# Patient Record
Sex: FEMALE | Race: WHITE | NOT HISPANIC OR LATINO | Employment: FULL TIME | URBAN - METROPOLITAN AREA
[De-identification: names, ages, dates, MRNs, and addresses within clinical notes are randomized per-mention and may not be internally consistent; named-entity substitution may affect disease eponyms.]

---

## 2023-05-18 ENCOUNTER — APPOINTMENT (OUTPATIENT)
Dept: PRIMARY CARE | Facility: CLINIC | Age: 58
End: 2023-05-18
Payer: COMMERCIAL

## 2023-06-05 ENCOUNTER — OFFICE VISIT (OUTPATIENT)
Dept: PRIMARY CARE | Facility: CLINIC | Age: 58
End: 2023-06-05
Payer: COMMERCIAL

## 2023-06-05 VITALS
HEIGHT: 64 IN | HEART RATE: 79 BPM | OXYGEN SATURATION: 96 % | WEIGHT: 228 LBS | BODY MASS INDEX: 38.93 KG/M2 | SYSTOLIC BLOOD PRESSURE: 102 MMHG | DIASTOLIC BLOOD PRESSURE: 70 MMHG | RESPIRATION RATE: 17 BRPM

## 2023-06-05 DIAGNOSIS — F41.9 ANXIETY DISORDER, UNSPECIFIED TYPE: ICD-10-CM

## 2023-06-05 DIAGNOSIS — H60.399: ICD-10-CM

## 2023-06-05 DIAGNOSIS — K21.9 GERD WITHOUT ESOPHAGITIS: ICD-10-CM

## 2023-06-05 DIAGNOSIS — N60.09 BENIGN BREAST CYST IN FEMALE, UNSPECIFIED LATERALITY: ICD-10-CM

## 2023-06-05 DIAGNOSIS — Z00.00 HEALTHCARE MAINTENANCE: Primary | ICD-10-CM

## 2023-06-05 DIAGNOSIS — Z13.21 ENCOUNTER FOR VITAMIN DEFICIENCY SCREENING: ICD-10-CM

## 2023-06-05 DIAGNOSIS — Z13.6 SCREENING FOR CARDIOVASCULAR CONDITION: ICD-10-CM

## 2023-06-05 PROBLEM — E66.9 OBESITY, CLASS II, BMI 35-39.9: Status: ACTIVE | Noted: 2019-12-02

## 2023-06-05 PROBLEM — E04.1 THYROID NODULE: Status: ACTIVE | Noted: 2018-11-07

## 2023-06-05 PROBLEM — E66.812 OBESITY, CLASS II, BMI 35-39.9: Status: ACTIVE | Noted: 2019-12-02

## 2023-06-05 PROBLEM — H90.6 MIXED CONDUCTIVE AND SENSORINEURAL HEARING LOSS OF BOTH EARS: Status: ACTIVE | Noted: 2018-11-16

## 2023-06-05 PROBLEM — L50.1 IDIOPATHIC URTICARIA: Status: ACTIVE | Noted: 2021-10-14

## 2023-06-05 PROBLEM — G47.33 OSA ON CPAP: Status: ACTIVE | Noted: 2023-06-05

## 2023-06-05 PROBLEM — E04.2 MULTINODULAR GOITER: Status: ACTIVE | Noted: 2023-06-05

## 2023-06-05 PROCEDURE — 1036F TOBACCO NON-USER: CPT | Performed by: INTERNAL MEDICINE

## 2023-06-05 PROCEDURE — 99386 PREV VISIT NEW AGE 40-64: CPT | Performed by: INTERNAL MEDICINE

## 2023-06-05 RX ORDER — FAMOTIDINE 20 MG/1
TABLET, FILM COATED ORAL
COMMUNITY
Start: 2015-02-02

## 2023-06-05 RX ORDER — CIPROFLOXACIN AND DEXAMETHASONE 3; 1 MG/ML; MG/ML
4 SUSPENSION/ DROPS AURICULAR (OTIC) 2 TIMES DAILY
Qty: 2.8 ML | Refills: 0 | Status: SHIPPED | OUTPATIENT
Start: 2023-06-05 | End: 2023-06-12

## 2023-06-05 RX ORDER — ESCITALOPRAM OXALATE 20 MG/1
20 TABLET ORAL DAILY
COMMUNITY
End: 2023-06-05 | Stop reason: SDUPTHER

## 2023-06-05 RX ORDER — ESCITALOPRAM OXALATE 20 MG/1
20 TABLET ORAL DAILY
Qty: 90 TABLET | Refills: 3 | Status: SHIPPED | OUTPATIENT
Start: 2023-06-05

## 2023-06-05 ASSESSMENT — ENCOUNTER SYMPTOMS
MUSCULOSKELETAL NEGATIVE: 1
ENDOCRINE NEGATIVE: 1
RESPIRATORY NEGATIVE: 1
PSYCHIATRIC NEGATIVE: 1
HEMATOLOGIC/LYMPHATIC NEGATIVE: 1
DEPRESSION: 0
CARDIOVASCULAR NEGATIVE: 1
NEUROLOGICAL NEGATIVE: 1
EYES NEGATIVE: 1
CONSTITUTIONAL NEGATIVE: 1
GASTROINTESTINAL NEGATIVE: 1

## 2023-06-05 ASSESSMENT — PATIENT HEALTH QUESTIONNAIRE - PHQ9
1. LITTLE INTEREST OR PLEASURE IN DOING THINGS: NOT AT ALL
2. FEELING DOWN, DEPRESSED OR HOPELESS: NOT AT ALL
SUM OF ALL RESPONSES TO PHQ9 QUESTIONS 1 AND 2: 0

## 2023-06-05 NOTE — PROGRESS NOTES
Subjective   Patient ID: Lexy Cummings is a 58 y.o. female who presents for Establish Care (NPV, physical ).  HPI    Npv  here for pe.   Came home from cruise with bad cough neg coivd tests for 3 tests.  Cont to  have cough.  Has lost her voice.  Has laryngitis 2-3 times a year.   Co increased weight.     Mammo  repeat image in April .      Gerd.  She takes low dose famotidine  wheich  she feels works pretty well. Sse tries to watch  chocolate, coffee.  She likes spicey foods.     Breast  cancer in family mom and sister.   Father  passed lung  cancer 18 mo ago.         Review of Systems   Constitutional: Negative.    HENT: Negative.     Eyes: Negative.    Respiratory: Negative.     Cardiovascular: Negative.    Gastrointestinal: Negative.    Endocrine: Negative.    Musculoskeletal: Negative.    Skin: Negative.    Neurological: Negative.    Hematological: Negative.    Psychiatric/Behavioral: Negative.     All other systems reviewed and are negative.      Objective   Physical Exam  Vitals and nursing note reviewed.   Constitutional:       Appearance: Normal appearance.   HENT:      Head: Normocephalic and atraumatic.      Right Ear: Tympanic membrane and ear canal normal. Decreased hearing noted. No drainage, swelling or tenderness.      Left Ear: Decreased hearing noted. Drainage present. No swelling or tenderness.      Ears:      Comments: Cheikh  hearing loss . Cheikh  hearing aide. Diffuse left canal exudate  nontender. Tm not  visualized.      Nose: Nose normal.      Mouth/Throat:      Mouth: Mucous membranes are moist.      Pharynx: Oropharynx is clear.   Eyes:      Extraocular Movements: Extraocular movements intact.      Conjunctiva/sclera: Conjunctivae normal.      Pupils: Pupils are equal, round, and reactive to light.   Cardiovascular:      Rate and Rhythm: Normal rate and regular rhythm.      Pulses: Normal pulses.      Heart sounds: Normal heart sounds.   Pulmonary:      Effort: Pulmonary effort is normal.       Breath sounds: Normal breath sounds.   Musculoskeletal:         General: Normal range of motion.      Cervical back: Neck supple.   Skin:     General: Skin is warm and dry.      Capillary Refill: Capillary refill takes 2 to 3 seconds.   Neurological:      General: No focal deficit present.      Mental Status: She is alert and oriented to person, place, and time. Mental status is at baseline.   Psychiatric:         Mood and Affect: Mood normal.         Behavior: Behavior normal.         Thought Content: Thought content normal.         Judgment: Judgment normal.         Assessment/Plan   Problem List Items Addressed This Visit          Medium    Anxiety disorder    Relevant Medications    escitalopram (Lexapro) 20 mg tablet     Other Visit Diagnoses       Healthcare maintenance    -  Primary    Relevant Orders    CBC    Comprehensive Metabolic Panel    Lipid Panel    TSH with reflex to Free T4 if abnormal    GERD without esophagitis        Screening for cardiovascular condition        Relevant Orders    Lipid Panel    Encounter for vitamin deficiency screening        Relevant Orders    Vitamin D 1,25 Dihydroxy    Benign breast cyst in female, unspecified laterality        Otitis externa, chronic infective        Relevant Medications    ciprofloxacin-dexamethasone (CiproDEX) otic suspension    Other Relevant Orders    Referral to ENT

## 2023-06-13 DIAGNOSIS — N39.0 URINARY TRACT INFECTION WITHOUT HEMATURIA, SITE UNSPECIFIED: ICD-10-CM

## 2023-06-13 DIAGNOSIS — R30.0 DYSURIA: Primary | ICD-10-CM

## 2023-06-15 ENCOUNTER — LAB (OUTPATIENT)
Dept: LAB | Facility: LAB | Age: 58
End: 2023-06-15
Payer: COMMERCIAL

## 2023-06-15 DIAGNOSIS — N39.0 URINARY TRACT INFECTION WITHOUT HEMATURIA, SITE UNSPECIFIED: ICD-10-CM

## 2023-06-15 DIAGNOSIS — R30.0 DYSURIA: ICD-10-CM

## 2023-06-15 LAB
APPEARANCE, URINE: CLEAR
BILIRUBIN, URINE: NEGATIVE
BLOOD, URINE: NEGATIVE
COLOR, URINE: YELLOW
GLUCOSE, URINE: NEGATIVE MG/DL
KETONES, URINE: NEGATIVE MG/DL
LEUKOCYTE ESTERASE, URINE: NEGATIVE
NITRITE, URINE: NEGATIVE
PH, URINE: 5 (ref 5–8)
PROTEIN, URINE: NEGATIVE MG/DL
SPECIFIC GRAVITY, URINE: 1.02 (ref 1–1.03)
UROBILINOGEN, URINE: <2 MG/DL (ref 0–1.9)

## 2023-06-15 PROCEDURE — 81003 URINALYSIS AUTO W/O SCOPE: CPT

## 2023-06-15 PROCEDURE — 87186 SC STD MICRODIL/AGAR DIL: CPT

## 2023-06-15 PROCEDURE — 87077 CULTURE AEROBIC IDENTIFY: CPT

## 2023-06-15 PROCEDURE — 87086 URINE CULTURE/COLONY COUNT: CPT

## 2023-06-16 DIAGNOSIS — R82.71 BACTERIA IN URINE: Primary | ICD-10-CM

## 2023-06-16 RX ORDER — NITROFURANTOIN 25; 75 MG/1; MG/1
100 CAPSULE ORAL 2 TIMES DAILY
Qty: 10 CAPSULE | Refills: 0 | Status: SHIPPED | OUTPATIENT
Start: 2023-06-16 | End: 2023-06-21

## 2023-06-17 LAB — URINE CULTURE: ABNORMAL

## 2023-06-18 DIAGNOSIS — N30.00 ACUTE CYSTITIS WITHOUT HEMATURIA: Primary | ICD-10-CM

## 2023-06-18 RX ORDER — CEPHALEXIN 500 MG/1
500 CAPSULE ORAL 2 TIMES DAILY
Qty: 20 CAPSULE | Refills: 0 | Status: SHIPPED | OUTPATIENT
Start: 2023-06-18 | End: 2023-06-28

## 2023-06-22 ENCOUNTER — LAB (OUTPATIENT)
Dept: LAB | Facility: LAB | Age: 58
End: 2023-06-22
Payer: COMMERCIAL

## 2023-06-22 DIAGNOSIS — Z13.6 SCREENING FOR CARDIOVASCULAR CONDITION: ICD-10-CM

## 2023-06-22 DIAGNOSIS — Z00.00 HEALTHCARE MAINTENANCE: ICD-10-CM

## 2023-06-22 DIAGNOSIS — Z13.21 ENCOUNTER FOR VITAMIN DEFICIENCY SCREENING: ICD-10-CM

## 2023-06-22 LAB
ALANINE AMINOTRANSFERASE (SGPT) (U/L) IN SER/PLAS: 17 U/L (ref 7–45)
ALBUMIN (G/DL) IN SER/PLAS: 4.5 G/DL (ref 3.4–5)
ALKALINE PHOSPHATASE (U/L) IN SER/PLAS: 71 U/L (ref 33–110)
ANION GAP IN SER/PLAS: 15 MMOL/L (ref 10–20)
ASPARTATE AMINOTRANSFERASE (SGOT) (U/L) IN SER/PLAS: 13 U/L (ref 9–39)
BILIRUBIN TOTAL (MG/DL) IN SER/PLAS: 0.7 MG/DL (ref 0–1.2)
CALCIUM (MG/DL) IN SER/PLAS: 9.6 MG/DL (ref 8.6–10.6)
CARBON DIOXIDE, TOTAL (MMOL/L) IN SER/PLAS: 27 MMOL/L (ref 21–32)
CHLORIDE (MMOL/L) IN SER/PLAS: 105 MMOL/L (ref 98–107)
CHOLESTEROL (MG/DL) IN SER/PLAS: 243 MG/DL (ref 0–199)
CHOLESTEROL IN HDL (MG/DL) IN SER/PLAS: 43.6 MG/DL
CHOLESTEROL/HDL RATIO: 5.6
CREATININE (MG/DL) IN SER/PLAS: 0.91 MG/DL (ref 0.5–1.05)
ERYTHROCYTE DISTRIBUTION WIDTH (RATIO) BY AUTOMATED COUNT: 12 % (ref 11.5–14.5)
ERYTHROCYTE MEAN CORPUSCULAR HEMOGLOBIN CONCENTRATION (G/DL) BY AUTOMATED: 33 G/DL (ref 32–36)
ERYTHROCYTE MEAN CORPUSCULAR VOLUME (FL) BY AUTOMATED COUNT: 94 FL (ref 80–100)
ERYTHROCYTES (10*6/UL) IN BLOOD BY AUTOMATED COUNT: 4.41 X10E12/L (ref 4–5.2)
GFR FEMALE: 73 ML/MIN/1.73M2
GLUCOSE (MG/DL) IN SER/PLAS: 126 MG/DL (ref 74–99)
HEMATOCRIT (%) IN BLOOD BY AUTOMATED COUNT: 41.5 % (ref 36–46)
HEMOGLOBIN (G/DL) IN BLOOD: 13.7 G/DL (ref 12–16)
LDL: 167 MG/DL (ref 0–99)
LEUKOCYTES (10*3/UL) IN BLOOD BY AUTOMATED COUNT: 4.2 X10E9/L (ref 4.4–11.3)
NRBC (PER 100 WBCS) BY AUTOMATED COUNT: 0 /100 WBC (ref 0–0)
PLATELETS (10*3/UL) IN BLOOD AUTOMATED COUNT: 211 X10E9/L (ref 150–450)
POTASSIUM (MMOL/L) IN SER/PLAS: 4.6 MMOL/L (ref 3.5–5.3)
PROTEIN TOTAL: 6.6 G/DL (ref 6.4–8.2)
SODIUM (MMOL/L) IN SER/PLAS: 142 MMOL/L (ref 136–145)
THYROTROPIN (MIU/L) IN SER/PLAS BY DETECTION LIMIT <= 0.05 MIU/L: 1.77 MIU/L (ref 0.44–3.98)
TRIGLYCERIDE (MG/DL) IN SER/PLAS: 161 MG/DL (ref 0–149)
UREA NITROGEN (MG/DL) IN SER/PLAS: 19 MG/DL (ref 6–23)
VLDL: 32 MG/DL (ref 0–40)

## 2023-06-22 PROCEDURE — 85027 COMPLETE CBC AUTOMATED: CPT

## 2023-06-22 PROCEDURE — 82652 VIT D 1 25-DIHYDROXY: CPT

## 2023-06-22 PROCEDURE — 83036 HEMOGLOBIN GLYCOSYLATED A1C: CPT

## 2023-06-22 PROCEDURE — 80061 LIPID PANEL: CPT

## 2023-06-22 PROCEDURE — 80053 COMPREHEN METABOLIC PANEL: CPT

## 2023-06-22 PROCEDURE — 84443 ASSAY THYROID STIM HORMONE: CPT

## 2023-06-22 PROCEDURE — 36415 COLL VENOUS BLD VENIPUNCTURE: CPT

## 2023-06-23 LAB
ESTIMATED AVERAGE GLUCOSE FOR HBA1C: 103 MG/DL
HEMOGLOBIN A1C/HEMOGLOBIN TOTAL IN BLOOD: 5.2 %

## 2023-06-26 LAB — VITAMIN D 1,25-DIHYDROXY: 40.1 PG/ML (ref 19.9–79.3)

## 2023-10-16 PROBLEM — H69.93 DYSFUNCTION OF BOTH EUSTACHIAN TUBES: Status: ACTIVE | Noted: 2023-10-16

## 2023-10-16 PROBLEM — N95.2 ATROPHIC VAGINITIS: Status: ACTIVE | Noted: 2023-10-16

## 2023-10-16 RX ORDER — EPINEPHRINE 0.3 MG/.3ML
1 INJECTION SUBCUTANEOUS ONCE AS NEEDED
COMMUNITY
Start: 2021-06-14

## 2023-10-18 ENCOUNTER — CLINICAL SUPPORT (OUTPATIENT)
Dept: AUDIOLOGY | Facility: CLINIC | Age: 58
End: 2023-10-18
Payer: COMMERCIAL

## 2023-10-18 DIAGNOSIS — H90.6 MIXED CONDUCTIVE AND SENSORINEURAL HEARING LOSS OF BOTH EARS: Primary | ICD-10-CM

## 2023-10-18 PROCEDURE — HRANC PR HEARING AID NO CHARGE: Performed by: AUDIOLOGIST

## 2023-10-18 NOTE — PROGRESS NOTES
HEARING AID CHECK    RIGHT: Phonak Bolero B 90 SP with acrylic half shell mold and #13 thick tubing with vents fully plugged and ton hook [id number]  Sn:  [id number]  LEFT:Phonak Bolero B 90 SP with acrylic half shell mold and #13 thick tubing with vents fully plugged and ton hook [id number]  Sn:  [id number]  Patient also uses a Com   FIT DATE: 2020  WARRANTY: none    This patient was seen for a HAC with the complaint that aids needed new tubing and needed cleaning.  Wants to establish herself here.  Otoscopy :  abnormal appearing drums as surgical ears .    The following programming changes were made: Connected to software and read from aids but patient felt left was not loud enough.  Not sure why so raised gain and MPO left until patient felt balanced and increased noise block to moderate.  Cleaned and checked and put in #13 thick tubing and new tone hooks [id number] and made sure vents in molds were still plugged.     The patient paid  no charge as new  patient complimentary service for today's visit.  Return in 6 months or sooner if needed.    APPOINTMENT TIME:40

## 2024-01-11 ENCOUNTER — CLINICAL SUPPORT (OUTPATIENT)
Dept: AUDIOLOGY | Facility: CLINIC | Age: 59
End: 2024-01-11
Payer: COMMERCIAL

## 2024-01-11 DIAGNOSIS — H90.3 SENSORINEURAL HEARING LOSS (SNHL) OF BOTH EARS: Primary | ICD-10-CM

## 2024-01-11 PROCEDURE — HRANC PR HEARING AID NO CHARGE: Performed by: AUDIOLOGIST

## 2024-01-11 NOTE — PROGRESS NOTES
HEARING AID CHECK    RIGHT:RIGHT: Phonak Bolero B 90 SP with acrylic half shell mold and #13 thick tubing with vents fully plugged and ton hook [id number]  Sn:  [id number]  LEFT:Phonak Bolero B 90 SP with acrylic half shell mold and #13 thick tubing with vents fully plugged and ton hook [id number]  Sn:  [id number]  Patient also uses a Com   FIT DATE: 2020  WARRANTY: none       This patient was seen for a HAC with the complaint that  one aid weak and static sounding while on cruise but better after cleaning. Today, I changed ear hooks, tubing and vacuumed ports and dehumidified aids and changed batteries.  Aids sounded better to patient. Wearing aids long days successfully.       Otoscopy:  Noted moisture in both ears but more in right cavity.  Patient has an appointment with ENT in near future.    Recommended to consider purchase of new aids as current aids are not made and costly for repair.     The following programming changes were made: clean and check only.    The patient paid $30.00  for today's visit.  Return in 6 months or sooner if needed.    APPOINTMENT TIME: 30 minutes

## 2024-03-08 ENCOUNTER — OFFICE VISIT (OUTPATIENT)
Dept: OTOLARYNGOLOGY | Facility: CLINIC | Age: 59
End: 2024-03-08
Payer: COMMERCIAL

## 2024-03-08 VITALS — WEIGHT: 228.6 LBS | HEIGHT: 64 IN | BODY MASS INDEX: 39.03 KG/M2 | TEMPERATURE: 97.3 F

## 2024-03-08 DIAGNOSIS — H92.10 OTORRHEA, UNSPECIFIED LATERALITY: Primary | ICD-10-CM

## 2024-03-08 PROCEDURE — 1036F TOBACCO NON-USER: CPT | Performed by: OTOLARYNGOLOGY

## 2024-03-08 PROCEDURE — 99213 OFFICE O/P EST LOW 20 MIN: CPT | Performed by: OTOLARYNGOLOGY

## 2024-03-08 RX ORDER — CIPROFLOXACIN AND DEXAMETHASONE 3; 1 MG/ML; MG/ML
SUSPENSION/ DROPS AURICULAR (OTIC)
Qty: 7.5 ML | Refills: 1 | Status: SHIPPED | OUTPATIENT
Start: 2024-03-08 | End: 2024-03-13

## 2024-03-08 NOTE — PROGRESS NOTES
"HPI  Lexy Cummings is a 59 y.o. female history of bilateral mastoidectomy.  She presents for routine mastoid management.  Wears hearing aids bilaterally.  Last audiogram in September with mixed hearing loss, thresholds sloping from about 60 dB to 100 dB bilaterally.  She does not have any otalgia on the left.  She does not perceive otorrhea on the left although some medial inflammation was seen incidentally on exam today.  On the right-hand side she has been leaving her hearing aid out because it is hurting.  She has recently had some increased tinnitus, sounds like a lawnmower      Past Medical History:   Diagnosis Date    Sleep apnea             Medications:     Current Outpatient Medications:     escitalopram (Lexapro) 20 mg tablet, Take 1 tablet (20 mg) by mouth once daily., Disp: 90 tablet, Rfl: 3    EPINEPHrine 0.3 mg/0.3 mL injection syringe, Inject 0.3 mL (0.3 mg) into the muscle 1 time if needed., Disp: , Rfl:     famotidine (Pepcid) 20 mg tablet, , Disp: , Rfl:     omalizumab (Xolair) 150 mg injection, Inject 150 mg under the skin., Disp: , Rfl:     omalizumab (Xolair) 150 mg/mL injection, Inject 2 mL (300 mg) under the skin., Disp: , Rfl:      Allergies:  Allergies   Allergen Reactions    Ibuprofen Anaphylaxis    Naproxen Anaphylaxis, Hives and Shortness of breath    Nsaids (Non-Steroidal Anti-Inflammatory Drug) Hives        Physical Exam:  Last Recorded Vitals  Temperature 36.3 °C (97.3 °F), height 1.626 m (5' 4\"), weight 104 kg (228 lb 9.6 oz).  General:     General appearance: Well-developed, well-nourished in no acute distress.       Voice:  normal       Head/face: Normal appearance; nontender to palpation     Facial nerve function: Normal and symmetric bilaterally.    Oral/oropharynx:     Oral vestibule: Normal labial and gingival mucosa     Tongue/floor of mouth: Normal without lesion     Oropharynx: Clear.  No lesions present of the hard/soft palate, posterior pharynx    Neck:     Neck: Normal " appearance, trachea midline     Salivary glands: Normal to palpation bilaterally     Lymph nodes: No cervical lymphadenopathy to palpation     Thyroid: No thyromegaly.  No palpable nodules     Range of motion: Normal    Neurological:     Cortical functions: Alert and oriented x3, appropriate affect       Larynx/hypopharynx:     Laryngeal findings: Mirror exam inadequate or limited secondary to enlarged base of tongue and/or excessive gagging    Ear:     Ear canal: Mastoidectomy bilaterally.  Right clean, dry, skin lined.  Anterior and inferior she has an erythematous area which looks like pressure discomfort which explains her pain on that side.  Left side with medial inflammation and otorrhea, suctioned.     Tympanic membrane: Intact and mobile bilaterally     Pinna: Normal bilaterally     Hearing:  Gross hearing assessment normal by voice    Nose:     Visualized using: Anterior rhinoscopy     Nasopharynx: Inadequate mirror exam secondary to gag, anatomy.       Nasal dorsum: Nontraumatic midline appearance     Septum: Midline     Inferior turbinates: Normally sized     Mucosa: Bilateral, pink, normal appearing       Assessment/Plan   Recommend update audiogram to evaluate the change in tinnitus.  Recommend see Franca regarding adjustment of the mold to see if some pressure can be relieved.  Left ear cleaned.  Recommend Ciprodex now for a few days and as needed for otorrhea.  Recheck 6 months, sooner as needed         Monico Mcgill MD

## 2024-03-13 ENCOUNTER — CLINICAL SUPPORT (OUTPATIENT)
Dept: AUDIOLOGY | Facility: CLINIC | Age: 59
End: 2024-03-13
Payer: COMMERCIAL

## 2024-03-13 DIAGNOSIS — H90.6 MIXED CONDUCTIVE AND SENSORINEURAL HEARING LOSS OF BOTH EARS: Primary | ICD-10-CM

## 2024-03-13 PROCEDURE — HRANC PR HEARING AID NO CHARGE: Performed by: AUDIOLOGIST

## 2024-03-13 NOTE — PROGRESS NOTES
"  HEARING AID CHECK    RIGHT:RIGHT:RIGHT: Phonak Bolero B 90 SP with acrylic half shell mold and #13 thick tubing with vents fully plugged and ton hook [id number]  Sn:  [id number]  LEFT:Phonak Bolero B 90 SP with acrylic half shell mold and #13 thick tubing with vents fully plugged and ton hook [id number]  Sn:  [id number]  Patient also uses a Com   FIT DATE: 2020  WARRANTY: none    This patient was seen for a HAC with the complaint last Friday saw Dr. Mcgill as  right ear canal hurting and sore at one spot  . Using drops for drainage and ground right earmold all over and at helix and antihelix area where she showed me on the mold where it is causing pain.  Just saw for a tubing adjustment and  patient will call if this did not help.  She felt that the small grinding made her ear feel a lt better , but will call.      Otoscopy : moist in both canals/ no wax/ just saw Dr. Mcgill.    The following programming changes were made: None to hearing aids    The patient paid  \"No charge\"for today's visit.  Return in 6 months or sooner if needed.    APPOINTMENT TIME:   "

## 2024-03-28 ENCOUNTER — APPOINTMENT (OUTPATIENT)
Dept: RADIOLOGY | Facility: CLINIC | Age: 59
End: 2024-03-28
Payer: COMMERCIAL

## 2024-03-28 DIAGNOSIS — Z12.31 SCREENING MAMMOGRAM FOR BREAST CANCER: ICD-10-CM

## 2024-03-29 ENCOUNTER — HOSPITAL ENCOUNTER (OUTPATIENT)
Dept: RADIOLOGY | Facility: HOSPITAL | Age: 59
Discharge: HOME | End: 2024-03-29
Payer: COMMERCIAL

## 2024-03-29 VITALS — WEIGHT: 228 LBS | HEIGHT: 64 IN | BODY MASS INDEX: 38.93 KG/M2

## 2024-03-29 DIAGNOSIS — Z12.31 SCREENING MAMMOGRAM FOR BREAST CANCER: ICD-10-CM

## 2024-03-29 PROCEDURE — 77067 SCR MAMMO BI INCL CAD: CPT

## 2024-03-29 PROCEDURE — 77063 BREAST TOMOSYNTHESIS BI: CPT | Performed by: RADIOLOGY

## 2024-03-29 PROCEDURE — 77067 SCR MAMMO BI INCL CAD: CPT | Performed by: RADIOLOGY

## 2024-04-18 DIAGNOSIS — R30.0 DYSURIA: Primary | ICD-10-CM

## 2024-04-18 RX ORDER — CIPROFLOXACIN 500 MG/1
500 TABLET ORAL 2 TIMES DAILY
Qty: 14 TABLET | Refills: 0 | Status: SHIPPED | OUTPATIENT
Start: 2024-04-18 | End: 2024-04-25

## 2024-05-08 ENCOUNTER — APPOINTMENT (OUTPATIENT)
Dept: OBSTETRICS AND GYNECOLOGY | Facility: CLINIC | Age: 59
End: 2024-05-08
Payer: COMMERCIAL

## 2024-06-03 ENCOUNTER — APPOINTMENT (OUTPATIENT)
Dept: PRIMARY CARE | Facility: CLINIC | Age: 59
End: 2024-06-03

## 2024-06-08 DIAGNOSIS — F41.9 ANXIETY DISORDER, UNSPECIFIED TYPE: ICD-10-CM

## 2024-06-12 RX ORDER — ESCITALOPRAM OXALATE 20 MG/1
20 TABLET ORAL DAILY
Qty: 90 TABLET | Refills: 0 | Status: SHIPPED | OUTPATIENT
Start: 2024-06-12 | End: 2024-06-15 | Stop reason: SDUPTHER

## 2024-06-15 DIAGNOSIS — F41.9 ANXIETY DISORDER, UNSPECIFIED TYPE: ICD-10-CM

## 2024-06-15 RX ORDER — ESCITALOPRAM OXALATE 20 MG/1
20 TABLET ORAL DAILY
Qty: 90 TABLET | Refills: 0 | Status: SHIPPED | OUTPATIENT
Start: 2024-06-15

## 2024-08-14 ENCOUNTER — APPOINTMENT (OUTPATIENT)
Dept: AUDIOLOGY | Facility: CLINIC | Age: 59
End: 2024-08-14

## 2024-08-14 DIAGNOSIS — H90.6 MIXED CONDUCTIVE AND SENSORINEURAL HEARING LOSS OF BOTH EARS: Primary | ICD-10-CM

## 2024-08-14 DIAGNOSIS — H69.93 DYSFUNCTION OF BOTH EUSTACHIAN TUBES: ICD-10-CM

## 2024-08-14 PROCEDURE — 92550 TYMPANOMETRY & REFLEX THRESH: CPT | Performed by: AUDIOLOGIST

## 2024-08-14 PROCEDURE — 92557 COMPREHENSIVE HEARING TEST: CPT | Performed by: AUDIOLOGIST

## 2024-08-14 NOTE — PROGRESS NOTES
AUDIOLOGY ADULT AUDIOMETRIC EVALUATION    Name:  Lexy Cummings  :  1965  Age:  59 y.o.  Date of Evaluation:  2024    Reason for visit: Ms. Cummings is seen in the clinic today at the request of otolaryngology for an audiologic evaluation.     HISTORY  Patient complains of frequent drainage and is using drops for long term hearing loss and surgeries in the past.  She denies tinnitus and dizziness.  She will see Dr. Mcgill in future for a follow up.    EVALUATION  See scanned audiogram: “Media” > “Audiology Report”.        RESULTS  Otoscopic Evaluation:  Right Ear: opaque  Left Ear: opaque    Immittance Measures:  Tympanometry:  Right Ear: Type B, reduced tympanic membrane mobility   Left Ear:  Type B, reduced tympanic membrane mobility     Acoustic Reflexes:  Ipsilateral Right Ear:  NR NR NR NR  Ipsilateral Left Ear:    NR NR NR NR  Contralateral Right Ear: did not evaluate  Contralateral Left Ear: did not evaluate      Audiometry:  Test Technique and Reliability:  BEHAVIORAL  Standard audiometry via supra-aural headphones. Reliability is good.    Pure tone air and bone conduction audiometry:  Right Ear:  SEVERE TO PROFOUND MIXED LOSS FAIRLY STABLE FROM , SLIGHTLY WORSENED.  Left Ear: SEVERE TO PROFOUND  MIXED LOSS FAIRLY STABLE FROM  AUDIOGRAM.    Speech Audiometry (Word Recognition Scores):   Right Ear:  100% EXCELLENT  Left Ear:     100% EXCELLENT    IMPRESSIONS   RIGHT EAR WORSENED MIXED LOSS FROM , LEFT FAIRLY STABLE FROM  WITH DRAINAGE.  The presence of acoustic reflexes within normal intensity limits is consistent with normal middle ear and brainstem function, and suggests that auditory sensitivity is not significantly impaired. An elevated or absent acoustic reflex threshold is consistent with a middle ear disorder, hearing loss in the stimulated ear, and/or interruption of neural innervation of the stapedius muscle. Present DPOAEs suggest normal/near normal cochlear outer hair  cell function and are consistent with no greater than a mild hearing loss at those frequencies. Absent DPOAEs are consistent with abnormal cochlear outer hair cell function and some degree of hearing loss at those frequencies.    RECOMMENDATIONS  - Follow up with otolaryngology  as scheduled.  - Audiologic evaluation as needed.  - Annual audiologic evaluation, sooner if an acute change is noted.  - Audiologic evaluation in conjunction with otologic care, if an acute change is noted, and/or annually.  - Consider new hearing aids. Contact insurance to determine if there is an applicable benefit and where it can be used. Contact our office to schedule an appointment should she wish to proceed with hearing aids through our clinic.  -if there is an applicable benefit and where it can be used.  - Continued hearing aid use. Complimentary clean and check today with tubing.  - Follow-up with audiology annually for routine hearing aid maintenance, sooner if questions/problems arise.  - Follow-up with medical care team as planned.    PATIENT EDUCATION  Discussed results, impressions and recommendations with the patient. Questions were addressed and the patient was encouraged to contact our office should concerns arise.    Time for this encounter:    Fish Perkins  Licensed Audiologist

## 2024-09-12 ENCOUNTER — LAB (OUTPATIENT)
Dept: LAB | Facility: LAB | Age: 59
End: 2024-09-12
Payer: COMMERCIAL

## 2024-09-12 ENCOUNTER — APPOINTMENT (OUTPATIENT)
Dept: PRIMARY CARE | Facility: CLINIC | Age: 59
End: 2024-09-12
Payer: COMMERCIAL

## 2024-09-12 VITALS
OXYGEN SATURATION: 94 % | TEMPERATURE: 98.1 F | WEIGHT: 232 LBS | DIASTOLIC BLOOD PRESSURE: 68 MMHG | HEIGHT: 65 IN | HEART RATE: 77 BPM | SYSTOLIC BLOOD PRESSURE: 110 MMHG | BODY MASS INDEX: 38.65 KG/M2

## 2024-09-12 DIAGNOSIS — Z00.00 GENERAL MEDICAL EXAM: Primary | ICD-10-CM

## 2024-09-12 DIAGNOSIS — E66.9 CLASS 2 OBESITY WITH BODY MASS INDEX (BMI) OF 38.0 TO 38.9 IN ADULT, UNSPECIFIED OBESITY TYPE, UNSPECIFIED WHETHER SERIOUS COMORBIDITY PRESENT: ICD-10-CM

## 2024-09-12 DIAGNOSIS — F41.1 GENERALIZED ANXIETY DISORDER: ICD-10-CM

## 2024-09-12 DIAGNOSIS — E55.9 VITAMIN D DEFICIENCY: ICD-10-CM

## 2024-09-12 DIAGNOSIS — F41.9 ANXIETY DISORDER, UNSPECIFIED TYPE: ICD-10-CM

## 2024-09-12 DIAGNOSIS — Z00.00 GENERAL MEDICAL EXAM: ICD-10-CM

## 2024-09-12 DIAGNOSIS — Z12.31 ENCOUNTER FOR SCREENING MAMMOGRAM FOR BREAST CANCER: ICD-10-CM

## 2024-09-12 LAB
25(OH)D3 SERPL-MCNC: 37 NG/ML (ref 31–100)
ALBUMIN SERPL-MCNC: 4.5 G/DL (ref 3.5–5)
ALP BLD-CCNC: 89 U/L (ref 35–125)
ALT SERPL-CCNC: 20 U/L (ref 5–40)
ANION GAP SERPL CALC-SCNC: 14 MMOL/L
AST SERPL-CCNC: 17 U/L (ref 5–40)
BASOPHILS # BLD AUTO: 0 X10*3/UL (ref 0–0.1)
BASOPHILS NFR BLD AUTO: 0 %
BILIRUB SERPL-MCNC: 0.4 MG/DL (ref 0.1–1.2)
BUN SERPL-MCNC: 16 MG/DL (ref 8–25)
CALCIUM SERPL-MCNC: 9.5 MG/DL (ref 8.5–10.4)
CHLORIDE SERPL-SCNC: 105 MMOL/L (ref 97–107)
CHOLEST SERPL-MCNC: 255 MG/DL (ref 133–200)
CHOLEST/HDLC SERPL: 5 {RATIO}
CO2 SERPL-SCNC: 24 MMOL/L (ref 24–31)
CREAT SERPL-MCNC: 0.8 MG/DL (ref 0.4–1.6)
EGFRCR SERPLBLD CKD-EPI 2021: 84 ML/MIN/1.73M*2
EOSINOPHIL # BLD AUTO: 0 X10*3/UL (ref 0–0.7)
EOSINOPHIL NFR BLD AUTO: 0 %
ERYTHROCYTE [DISTWIDTH] IN BLOOD BY AUTOMATED COUNT: 12.1 % (ref 11.5–14.5)
EST. AVERAGE GLUCOSE BLD GHB EST-MCNC: 100 MG/DL
GLUCOSE SERPL-MCNC: 114 MG/DL (ref 65–99)
HBA1C MFR BLD: 5.1 %
HCT VFR BLD AUTO: 40.6 % (ref 36–46)
HDLC SERPL-MCNC: 51 MG/DL
HGB BLD-MCNC: 13.7 G/DL (ref 12–16)
IMM GRANULOCYTES # BLD AUTO: 0.02 X10*3/UL (ref 0–0.7)
IMM GRANULOCYTES NFR BLD AUTO: 0.4 % (ref 0–0.9)
LDLC SERPL CALC-MCNC: 169 MG/DL (ref 65–130)
LYMPHOCYTES # BLD AUTO: 1.33 X10*3/UL (ref 1.2–4.8)
LYMPHOCYTES NFR BLD AUTO: 24.1 %
MCH RBC QN AUTO: 31.5 PG (ref 26–34)
MCHC RBC AUTO-ENTMCNC: 33.7 G/DL (ref 32–36)
MCV RBC AUTO: 93 FL (ref 80–100)
MONOCYTES # BLD AUTO: 0.47 X10*3/UL (ref 0.1–1)
MONOCYTES NFR BLD AUTO: 8.5 %
NEUTROPHILS # BLD AUTO: 3.7 X10*3/UL (ref 1.2–7.7)
NEUTROPHILS NFR BLD AUTO: 67 %
NRBC BLD-RTO: 0 /100 WBCS (ref 0–0)
PLATELET # BLD AUTO: 198 X10*3/UL (ref 150–450)
POTASSIUM SERPL-SCNC: 4.1 MMOL/L (ref 3.4–5.1)
PROT SERPL-MCNC: 6.6 G/DL (ref 5.9–7.9)
RBC # BLD AUTO: 4.35 X10*6/UL (ref 4–5.2)
SODIUM SERPL-SCNC: 143 MMOL/L (ref 133–145)
TRIGL SERPL-MCNC: 175 MG/DL (ref 40–150)
TSH SERPL DL<=0.05 MIU/L-ACNC: 1.14 MIU/L (ref 0.27–4.2)
WBC # BLD AUTO: 5.5 X10*3/UL (ref 4.4–11.3)

## 2024-09-12 PROCEDURE — 3008F BODY MASS INDEX DOCD: CPT

## 2024-09-12 PROCEDURE — 84443 ASSAY THYROID STIM HORMONE: CPT

## 2024-09-12 PROCEDURE — 36415 COLL VENOUS BLD VENIPUNCTURE: CPT

## 2024-09-12 PROCEDURE — 90715 TDAP VACCINE 7 YRS/> IM: CPT

## 2024-09-12 PROCEDURE — 99386 PREV VISIT NEW AGE 40-64: CPT

## 2024-09-12 PROCEDURE — 85025 COMPLETE CBC W/AUTO DIFF WBC: CPT

## 2024-09-12 PROCEDURE — 83036 HEMOGLOBIN GLYCOSYLATED A1C: CPT

## 2024-09-12 PROCEDURE — 90656 IIV3 VACC NO PRSV 0.5 ML IM: CPT

## 2024-09-12 PROCEDURE — 90472 IMMUNIZATION ADMIN EACH ADD: CPT

## 2024-09-12 PROCEDURE — 82306 VITAMIN D 25 HYDROXY: CPT

## 2024-09-12 PROCEDURE — 80053 COMPREHEN METABOLIC PANEL: CPT

## 2024-09-12 PROCEDURE — 90471 IMMUNIZATION ADMIN: CPT

## 2024-09-12 PROCEDURE — 80061 LIPID PANEL: CPT

## 2024-09-12 RX ORDER — ESCITALOPRAM OXALATE 20 MG/1
20 TABLET ORAL DAILY
Qty: 90 TABLET | Refills: 1 | Status: SHIPPED | OUTPATIENT
Start: 2024-09-12

## 2024-09-12 ASSESSMENT — VISUAL ACUITY: OU: 1

## 2024-09-12 ASSESSMENT — PAIN SCALES - GENERAL: PAINLEVEL: 0-NO PAIN

## 2024-09-12 NOTE — PROGRESS NOTES
Subjective   Patient ID: Lexy Cummings is a 59 y.o. female who presents for Annual Exam (New patient ).    HPI     Lexy Cummings presents for annual physical exam. She is a new patient to this provider, establishing care today. Previously a patient of Dr. Iniguez, who has recently retired.     No new concerns at this visit.  No recent hospitalizations or illness.       Patient's recent visit notes, medication and allergy lists, past medical surgical social hx, immunization, vitals, problem list, recent tests were reviewed by me for pertinence to this visit.      PMH:     Anxiety- Lexapro 20 mg daily  GERD- famotidine 20 mg daily    **  Follows closely with ENT, Dr. Mcgill, for hx of bilateral cholesteatomas with surgical removal, multiple inner ear surgeries- reconstructed inner ears  Wears hearing aids.       Current Outpatient Medications:     escitalopram (Lexapro) 20 mg tablet, Take 1 tablet (20 mg) by mouth once daily., Disp: 90 tablet, Rfl: 0    famotidine (Pepcid) 20 mg tablet, , Disp: , Rfl:       Social Hx:   36 y/o, 4 adult children, 6 grandchildren  Works FT as a    Smoking: No  Alcohol: Yes - 2-3 glasses of wine/beer weekends only  Recreational drug use: No      Screening tools:    -Colonoscopy: Yes - thru [facility] [id number]- unsure of specific dates, will follow up    -Mammogram: Yes - 3/2024    -PAP: Yes - last completed 2022- Neg/neg; due 2027  LMP: 2011          Vaccinations:  Tdap: update today  Flu Vaccine: update today  Shingles: completed      Review of Systems  GENERAL - Denies fever/chills, recent illness, unexplained weight loss  HEENT- Denies change in vision, double vision, blurred. Denies hearing changes, ear pain, but does have history or diminished hearing, multiple inner ear surgeries. Denies nose bleeds. Denies sore throat, difficulty swallowing.    RESP - Denies SOB or cough  CVS - Denies CP, palpitations  GI - Denies nausea or abdominal pain, hematochezia/melena  -  "Denies urinary frequency, urgency or incontinence.  Denies nocturia.   NEURO - Denies headache, dizziness  MSK - Denies joint, neck or back pain  Skin - Denies abnormal lesions, rash  PSYCH-Denies anxiety, depression, changes in mood          Objective   /68 (BP Location: Left arm, Patient Position: Sitting)   Pulse 77   Temp 36.7 °C (98.1 °F) (Temporal)   Ht 1.651 m (5' 5\")   Wt 105 kg (232 lb)   LMP  (LMP Unknown)   SpO2 94%   BMI 38.61 kg/m²     Physical Exam  Vitals and nursing note reviewed.   Constitutional:       General: She is not in acute distress.     Appearance: Normal appearance. She is well-developed and well-groomed. She is obese.   HENT:      Head: Normocephalic.      Jaw: There is normal jaw occlusion.      Right Ear: Ear canal and external ear normal.      Left Ear: Ear canal and external ear normal.      Ears:      Comments: Bilateral hearing aids  Right TM- dull in appearance with scarring noted.  Left TM occluded with cerumen     Nose: Nose normal.      Mouth/Throat:      Lips: Pink.      Mouth: Mucous membranes are moist.      Pharynx: Oropharynx is clear. Uvula midline.   Eyes:      General: Lids are normal. Vision grossly intact. Gaze aligned appropriately.      Extraocular Movements: Extraocular movements intact.      Conjunctiva/sclera: Conjunctivae normal.      Pupils: Pupils are equal, round, and reactive to light.   Neck:      Thyroid: No thyromegaly.      Vascular: No carotid bruit or JVD.      Trachea: Trachea and phonation normal.   Cardiovascular:      Rate and Rhythm: Normal rate and regular rhythm.      Pulses: Normal pulses.      Heart sounds: Normal heart sounds, S1 normal and S2 normal.   Pulmonary:      Effort: Pulmonary effort is normal.      Breath sounds: Normal breath sounds and air entry.   Abdominal:      General: Bowel sounds are normal. There is no distension.      Palpations: Abdomen is soft. There is no hepatomegaly, splenomegaly or mass.      Tenderness: " There is no abdominal tenderness. There is no right CVA tenderness, left CVA tenderness or guarding.   Musculoskeletal:         General: Normal range of motion.      Cervical back: Normal, full passive range of motion without pain, normal range of motion and neck supple.      Thoracic back: Normal.      Lumbar back: Normal.      Right lower leg: No edema.      Left lower leg: No edema.   Lymphadenopathy:      Head:      Right side of head: No submental, submandibular, tonsillar, preauricular, posterior auricular or occipital adenopathy.      Left side of head: No submental, submandibular, tonsillar, preauricular, posterior auricular or occipital adenopathy.      Cervical: No cervical adenopathy.      Right cervical: No superficial or posterior cervical adenopathy.     Left cervical: No superficial or posterior cervical adenopathy.      Upper Body:      Right upper body: No supraclavicular adenopathy.      Left upper body: No supraclavicular adenopathy.   Skin:     General: Skin is warm and dry.      Capillary Refill: Capillary refill takes less than 2 seconds.   Neurological:      General: No focal deficit present.      Mental Status: She is alert and oriented to person, place, and time.      Cranial Nerves: Cranial nerves 2-12 are intact.      Sensory: Sensation is intact.      Motor: Motor function is intact.      Coordination: Coordination is intact.      Gait: Gait is intact.   Psychiatric:         Attention and Perception: Attention and perception normal.         Mood and Affect: Mood and affect normal.         Speech: Speech normal.         Behavior: Behavior normal. Behavior is cooperative.         Thought Content: Thought content normal.         Cognition and Memory: Cognition normal.         Judgment: Judgment normal.           Assessment & Plan  General medical exam  Well adult exam.  1. Age appropriate preventative measures reviewed.   2. Encouraged healthy diet and exercise.  3. Immunizations-  Reviewed  4. Labs- ordered at this visit  5. Medications- Reviewed    *Follow-up in 1 year for repeat annual physical exam. Patient verbalizes understanding  regarding plan of care and all questions answered.    Orders:    CBC and Auto Differential; Future    Comprehensive metabolic panel; Future    Lipid Panel; Future    TSH with reflex to Free T4 if abnormal; Future    Vitamin D 25-Hydroxy,Total (for eval of Vitamin D levels); Future    Hemoglobin A1C; Future    Generalized anxiety disorder  Anxiety well controlled.   Continue Lexapro 20 mg by mouth daily.  Refills sent to pharmacy.   Discussed medication desired effects, potential side effects, and how to administer the medication.   Discussed non-pharmacological interventions such seeing a therapist, stress reduction, diet, exercise, and sleep.   Follow up in 6 months or sooner if needed.   Patient verbalizes understanding regarding plan of care and all questions answered.    Orders:    escitalopram (Lexapro) 20 mg tablet; Take 1 tablet (20 mg) by mouth once daily.    Class 2 obesity with body mass index (BMI) of 38.0 to 38.9 in adult, unspecified obesity type, unspecified whether serious comorbidity present  Discussed diet and exercise interventions for weight management.  -tracking meals in a meal tracking denise  -portion control  -reduce simple carbs, sugary beverages  -maintain hydration  -engage in 30-40 minutes of exercise at least 5 days per week  Will obtain labs as discussed to determine possible underlying causes of obesity. Will follow up upon result.    Orders:    TSH with reflex to Free T4 if abnormal; Future    Hemoglobin A1C; Future    Vitamin D deficiency    Orders:    Vitamin D 25-Hydroxy,Total (for eval of Vitamin D levels); Future    Encounter for screening mammogram for breast cancer    Orders:    BI mammo bilateral screening tomosynthesis; Future

## 2024-09-12 NOTE — ASSESSMENT & PLAN NOTE
Anxiety well controlled.   Continue Lexapro 20 mg by mouth daily.  Refills sent to pharmacy.   Discussed medication desired effects, potential side effects, and how to administer the medication.   Discussed non-pharmacological interventions such seeing a therapist, stress reduction, diet, exercise, and sleep.   Follow up in 6 months or sooner if needed.   Patient verbalizes understanding regarding plan of care and all questions answered.    Orders:    escitalopram (Lexapro) 20 mg tablet; Take 1 tablet (20 mg) by mouth once daily.

## 2024-09-13 ENCOUNTER — APPOINTMENT (OUTPATIENT)
Dept: OTOLARYNGOLOGY | Facility: CLINIC | Age: 59
End: 2024-09-13

## 2024-09-13 VITALS — HEIGHT: 65 IN | BODY MASS INDEX: 38.61 KG/M2

## 2024-09-13 DIAGNOSIS — H90.6 MIXED CONDUCTIVE AND SENSORINEURAL HEARING LOSS OF BOTH EARS: ICD-10-CM

## 2024-09-13 DIAGNOSIS — H92.10 OTORRHEA, UNSPECIFIED LATERALITY: Primary | ICD-10-CM

## 2024-09-13 PROCEDURE — 99214 OFFICE O/P EST MOD 30 MIN: CPT | Performed by: OTOLARYNGOLOGY

## 2024-09-13 PROCEDURE — 1036F TOBACCO NON-USER: CPT | Performed by: OTOLARYNGOLOGY

## 2024-09-13 RX ORDER — CIPROFLOXACIN AND DEXAMETHASONE 3; 1 MG/ML; MG/ML
SUSPENSION/ DROPS AURICULAR (OTIC)
Qty: 7.5 ML | Refills: 1 | Status: SHIPPED | OUTPATIENT
Start: 2024-09-13 | End: 2024-09-18

## 2024-09-13 NOTE — PROGRESS NOTES
"HPI  Lexy Cummings is a 59 y.o. female history of bilateral mastoidectomy.  She presents for routine mastoid management.  Wears hearing aids bilaterally.  Last audiogram in August 2024 with mixed hearing loss, thresholds sloping from about 60 dB to 100 dB bilaterally.  She does not have any otalgia on the left.  She does have some otorrhea on the left.  The right side is hurting her at the superior posterior external auditory meatus with the mold     Past Medical History:   Diagnosis Date    Allergic 2/2/2020    Fatigue     HL (hearing loss) Following several sets of tubes, two mastoidectomies, and multiple other surgeries.    Sleep apnea     Tinnitus             Medications:     Current Outpatient Medications:     escitalopram (Lexapro) 20 mg tablet, Take 1 tablet (20 mg) by mouth once daily., Disp: 90 tablet, Rfl: 1    famotidine (Pepcid) 20 mg tablet, , Disp: , Rfl:      Allergies:  Allergies   Allergen Reactions    Ibuprofen Anaphylaxis    Naproxen Anaphylaxis, Hives and Shortness of breath    Nsaids (Non-Steroidal Anti-Inflammatory Drug) Hives        Physical Exam:  Last Recorded Vitals  Height 1.651 m (5' 5\").  General:     General appearance: Well-developed, well-nourished in no acute distress.       Voice:  normal       Head/face: Normal appearance; nontender to palpation     Facial nerve function: Normal and symmetric bilaterally.    Oral/oropharynx:     Oral vestibule: Normal labial and gingival mucosa     Tongue/floor of mouth: Normal without lesion     Oropharynx: Clear.  No lesions present of the hard/soft palate, posterior pharynx    Neck:     Neck: Normal appearance, trachea midline     Salivary glands: Normal to palpation bilaterally     Lymph nodes: No cervical lymphadenopathy to palpation     Thyroid: No thyromegaly.  No palpable nodules     Range of motion: Normal    Neurological:     Cortical functions: Alert and oriented x3, appropriate affect       Larynx/hypopharynx:     Laryngeal findings: " Mirror exam inadequate or limited secondary to enlarged base of tongue and/or excessive gagging    Ear:     Ear canal: Mastoidectomy bilaterally.  Right clean, dry, skin lined.  Posterior and superior she has pressure erythema.  Left side again with medial inflammation and otorrhea, suctioned.     Tympanic membrane: Intact and mobile bilaterally     Pinna: Normal bilaterally     Hearing:  Gross hearing assessment normal by voice    Nose:     Visualized using: Anterior rhinoscopy     Nasopharynx: Inadequate mirror exam secondary to gag, anatomy.       Nasal dorsum: Nontraumatic midline appearance     Septum: Midline     Inferior turbinates: Normally sized     Mucosa: Bilateral, pink, normal appearing       Assessment/Plan   We reviewed the audiogram.  She will continue her hearing aids.  Recommend Ciprodex to the left ear for a few days and as needed.  I have recommended that she have the Bold adjusted on the right.  Recheck 6 months, sooner as needed         Monico Mcgill MD

## 2024-09-19 ENCOUNTER — APPOINTMENT (OUTPATIENT)
Dept: AUDIOLOGY | Facility: CLINIC | Age: 59
End: 2024-09-19
Payer: COMMERCIAL

## 2025-01-09 ENCOUNTER — CLINICAL SUPPORT (OUTPATIENT)
Dept: AUDIOLOGY | Facility: CLINIC | Age: 60
End: 2025-01-09

## 2025-01-09 DIAGNOSIS — H90.6 MIXED CONDUCTIVE AND SENSORINEURAL HEARING LOSS OF BOTH EARS: Primary | ICD-10-CM

## 2025-01-09 NOTE — PROGRESS NOTES
HEARING AID CHECK    RIGHT: Phonak Bolero B 90 SP with acrylic half shell mold and #13 thick tubing with vents fully plugged and ton hook [id number]  Sn:  [id number]  LEFT:Phonak Bolero B 90 SP with acrylic half shell mold and #13 thick tubing with vents fully plugged and ton hook [id number]  Sn:  [id number]  Patient also uses a Com   FIT DATE: 2020  WARRANTY: none       This patient was seen for a HAC with the complaint that   she is not hearing well. Aids sound a bit noisy to me but she feels they are working. Last audiogram in April 2024 and should schedule for new audiogram if this complaint persists. Today's appointment for new earmolds is not long enough for a test. Patient has surgical ears and impressions were made without incident but I told her I prefer to make impressions on surgical ears when a doctor is in the office. Discussed our pricing for new aids.    Otoscopy : reddened but no drainage today.    The following programming changes were made:None to aids as no time for this, patient will have to make an appointment or have a longer appointment at earmold . Waiting for pricing from [place] before P.O # request.     The patient paid  $180.00  for today's visit.  Return in 6 months or sooner if needed.    APPOINTMENT TIME:  30 minutes.

## 2025-02-04 ENCOUNTER — TELEPHONE (OUTPATIENT)
Dept: PRIMARY CARE | Facility: CLINIC | Age: 60
End: 2025-02-04
Payer: COMMERCIAL

## 2025-02-04 DIAGNOSIS — E78.2 MIXED HYPERLIPIDEMIA: ICD-10-CM

## 2025-02-13 DIAGNOSIS — E78.2 MIXED HYPERLIPIDEMIA: ICD-10-CM

## 2025-02-19 ENCOUNTER — TELEPHONE (OUTPATIENT)
Dept: AUDIOLOGY | Facility: CLINIC | Age: 60
End: 2025-02-19
Payer: COMMERCIAL

## 2025-02-19 NOTE — TELEPHONE ENCOUNTER
Ms. Cummings was called and will be coming in to be fit with her new earmolds 2/26 in [place]. Hearing aids were lost in the mail and needed to be reordered.

## 2025-02-26 ENCOUNTER — APPOINTMENT (OUTPATIENT)
Dept: AUDIOLOGY | Facility: CLINIC | Age: 60
End: 2025-02-26

## 2025-02-26 DIAGNOSIS — H90.6 MIXED CONDUCTIVE AND SENSORINEURAL HEARING LOSS OF BOTH EARS: Primary | ICD-10-CM

## 2025-02-26 NOTE — PROGRESS NOTES
HEARING AID CHECK    RIGHT: Phonak Bolero B 90 SP with acrylic half shell mold and #13 thick tubing with vents fully plugged and tone hook .[id number] in past but today new molds.  Sn:  [id number]  LEFT:Phonak Bolero B 90 SP with acrylic half shell mold and #13 thick tubing with vents fully plugged and ton hook [id number] in past but today new molds.  Sn:  [id number]  Patient also uses a Com   FIT DATE: 2020  WARRANTY: none    This patient was seen for an ear mold  and fit and was pleased as no feedback from aids at usual volume position. Aids are older and patient will see Hillary for a hearing aid evaluation in May after she consults with her cousin who sells hearing aids. Lexy will also check her insurance and knows that our price for the [employer] product is now $4.000.00 instead of $3500.00. Her last audiogram was in August 2024 and she has a bilateral mixed loss with frequent drainage.     Otoscopy : Moist  looking TM's in both ears but no drainage .    The following programming changes were made: None and patient was pleased at no FB at current volume settings.  I encouraged her to pursue new aids as she will benefit from new technology and wears aids often .    The patient paid no charge as already paid  for  ear molds when sent in to lab . Return in May  or sooner if needed.    APPOINTMENT TIME: 30 minutes

## 2025-02-28 ENCOUNTER — APPOINTMENT (OUTPATIENT)
Dept: PRIMARY CARE | Facility: CLINIC | Age: 60
End: 2025-02-28
Payer: COMMERCIAL

## 2025-03-13 ENCOUNTER — PATIENT MESSAGE (OUTPATIENT)
Dept: OTOLARYNGOLOGY | Facility: CLINIC | Age: 60
End: 2025-03-13

## 2025-03-13 ENCOUNTER — APPOINTMENT (OUTPATIENT)
Dept: PRIMARY CARE | Facility: CLINIC | Age: 60
End: 2025-03-13
Payer: COMMERCIAL

## 2025-03-13 DIAGNOSIS — H92.10 OTORRHEA, UNSPECIFIED LATERALITY: Primary | ICD-10-CM

## 2025-03-14 ENCOUNTER — APPOINTMENT (OUTPATIENT)
Dept: OTOLARYNGOLOGY | Facility: CLINIC | Age: 60
End: 2025-03-14
Payer: COMMERCIAL

## 2025-03-14 RX ORDER — CIPROFLOXACIN AND DEXAMETHASONE 3; 1 MG/ML; MG/ML
SUSPENSION/ DROPS AURICULAR (OTIC)
Qty: 7.5 ML | Refills: 1 | Status: SHIPPED | OUTPATIENT
Start: 2025-03-14 | End: 2025-03-19

## 2025-03-19 ENCOUNTER — TELEPHONE (OUTPATIENT)
Dept: OTOLARYNGOLOGY | Facility: CLINIC | Age: 60
End: 2025-03-19
Payer: COMMERCIAL

## 2025-04-02 ENCOUNTER — APPOINTMENT (OUTPATIENT)
Dept: RADIOLOGY | Facility: CLINIC | Age: 60
End: 2025-04-02
Payer: COMMERCIAL

## 2025-04-04 ENCOUNTER — APPOINTMENT (OUTPATIENT)
Dept: AUDIOLOGY | Facility: CLINIC | Age: 60
End: 2025-04-04

## 2025-04-04 DIAGNOSIS — H90.6 MIXED CONDUCTIVE AND SENSORINEURAL HEARING LOSS OF BOTH EARS: Primary | ICD-10-CM

## 2025-04-04 NOTE — PROGRESS NOTES
Patient here today due to new earmolds causing irritation . The right in canal area and left not as bothersome but in helix area.  Buffed with blue stone grinding insert for bulk and snipped some of the helix area for fit.  Feedback was never noted even with original fit and none noticed today.  I called [facility] and Mary made a note that we may need to extend the trial period of the molds in the event that we need to remake.      Lexy has an appointment to see Hillary in the future as she will be getting new aids from a relative who dispenses hearing aids .  She is aware of the brands that we use here at our clinic.

## 2025-04-09 ENCOUNTER — APPOINTMENT (OUTPATIENT)
Dept: PRIMARY CARE | Facility: CLINIC | Age: 60
End: 2025-04-09
Payer: COMMERCIAL

## 2025-05-09 ENCOUNTER — TELEPHONE (OUTPATIENT)
Dept: PRIMARY CARE | Facility: CLINIC | Age: 60
End: 2025-05-09

## 2025-05-09 ENCOUNTER — APPOINTMENT (OUTPATIENT)
Dept: PRIMARY CARE | Facility: CLINIC | Age: 60
End: 2025-05-09
Payer: COMMERCIAL

## 2025-05-09 VITALS
HEIGHT: 65 IN | WEIGHT: 228.8 LBS | DIASTOLIC BLOOD PRESSURE: 80 MMHG | TEMPERATURE: 97.6 F | BODY MASS INDEX: 38.12 KG/M2 | HEART RATE: 76 BPM | OXYGEN SATURATION: 94 % | SYSTOLIC BLOOD PRESSURE: 98 MMHG

## 2025-05-09 DIAGNOSIS — E78.2 MIXED HYPERLIPIDEMIA: ICD-10-CM

## 2025-05-09 DIAGNOSIS — F41.1 GENERALIZED ANXIETY DISORDER: Primary | ICD-10-CM

## 2025-05-09 DIAGNOSIS — Z00.00 GENERAL MEDICAL EXAM: ICD-10-CM

## 2025-05-09 DIAGNOSIS — E55.9 VITAMIN D DEFICIENCY: ICD-10-CM

## 2025-05-09 PROCEDURE — 1036F TOBACCO NON-USER: CPT

## 2025-05-09 PROCEDURE — 3008F BODY MASS INDEX DOCD: CPT

## 2025-05-09 PROCEDURE — 99213 OFFICE O/P EST LOW 20 MIN: CPT

## 2025-05-09 PROCEDURE — G8433 SCR FOR DEP NOT CPT DOC RSN: HCPCS

## 2025-05-09 RX ORDER — ESCITALOPRAM OXALATE 20 MG/1
20 TABLET ORAL DAILY
Qty: 90 TABLET | Refills: 1 | Status: SHIPPED | OUTPATIENT
Start: 2025-05-09

## 2025-05-09 ASSESSMENT — COLUMBIA-SUICIDE SEVERITY RATING SCALE - C-SSRS
6. HAVE YOU EVER DONE ANYTHING, STARTED TO DO ANYTHING, OR PREPARED TO DO ANYTHING TO END YOUR LIFE?: NO
2. HAVE YOU ACTUALLY HAD ANY THOUGHTS OF KILLING YOURSELF?: NO
1. IN THE PAST MONTH, HAVE YOU WISHED YOU WERE DEAD OR WISHED YOU COULD GO TO SLEEP AND NOT WAKE UP?: NO

## 2025-05-09 ASSESSMENT — PATIENT HEALTH QUESTIONNAIRE - PHQ9
2. FEELING DOWN, DEPRESSED OR HOPELESS: NOT AT ALL
SUM OF ALL RESPONSES TO PHQ9 QUESTIONS 1 AND 2: 0
1. LITTLE INTEREST OR PLEASURE IN DOING THINGS: NOT AT ALL

## 2025-05-09 NOTE — PROGRESS NOTES
"Subjective     Patient ID: Lexy Cummings is a 60 y.o. female who presents for Follow-up.      HPI    Lexy presents for interval follow up.    Lexy Cummings presents for followup of generalized anxiety disorder.   Taking Lexapro 20 mg daily, no missed doses, and tolerating med.   No symptoms of excessive worry, nervous/anxious behavior, panic, palpitations, or restlessness.  Symptoms not affecting daily activity.  Saw a counselor in the past, but does not feel the need now.   Denies intent to harm self or others. No recent mental health hospitalizations.    She also presents for follow-up for hyperlipidemia.  She admits that she has not made any dietary changes since her physical in September.  No predraw labs completed for this visit.  Last lipid panel: , HDL 51, , triglycerides 175 completed on 9/12/2024.    Patient's recent visit notes, medication and allergy lists, past medical surgical social hx, immunization, vitals, problem list, recent tests were reviewed by me for pertinence to this visit.        Review of Systems  All other systems have been reviewed and are negative except as noted in the HPI.         Objective   BP 98/80 (BP Location: Left arm, Patient Position: Sitting, BP Cuff Size: Adult)   Pulse 76   Temp 36.4 °C (97.6 °F) (Temporal)   Ht 1.651 m (5' 5\")   Wt 104 kg (228 lb 12.8 oz)   LMP  (LMP Unknown)   SpO2 94%   BMI 38.07 kg/m²       Physical Exam  Vitals and nursing note reviewed.   Constitutional:       General: She is not in acute distress.     Appearance: Normal appearance.   Neck:      Vascular: No carotid bruit.   Cardiovascular:      Rate and Rhythm: Normal rate and regular rhythm.      Pulses: Normal pulses.      Heart sounds: Normal heart sounds, S1 normal and S2 normal. No murmur heard.  Pulmonary:      Effort: Pulmonary effort is normal.      Breath sounds: Normal breath sounds and air entry.   Musculoskeletal:      Cervical back: Normal range of motion and neck supple. "      Right lower leg: No edema.      Left lower leg: No edema.   Lymphadenopathy:      Cervical: No cervical adenopathy.   Skin:     General: Skin is warm and dry.   Neurological:      General: No focal deficit present.      Mental Status: She is alert. Mental status is at baseline.   Psychiatric:         Behavior: Behavior is cooperative.             Assessment & Plan  Generalized anxiety disorder  Anxiety well controlled. Refills sent to pharmacy.   Discussed medication desired effects, potential side effects, and how to administer the medication.   Discussed non-pharmacological interventions such seeing a therapist, stress reduction, diet, exercise, and sleep.   Follow up in 6 months or sooner if needed.   Patient verbalizes understanding regarding plan of care and all questions answered.    Orders:    escitalopram (Lexapro) 20 mg tablet; Take 1 tablet (20 mg) by mouth once daily.    Mixed hyperlipidemia  Chronic condition  She has not made any real dietary changes since her last visit.  Did not complete predrawn labs.  We discussed dietary and exercise interventions for tighter control of her cholesterol levels.  We discussed adding fiber to her diet with a goal of 25 to 30 g/day. she is agreeable to beginning these interventions.  We will plan to recheck her cholesterol level with her annual physical exam which is due in September.  We also discussed obtaining CT calcium scoring for further surveillance.  Will follow-up with results once completed.  She would like to remain off of lipid-lowering medications if at all possible, and is motivated to changing her diet and exercise routines.  Orders:    CT cardiac scoring wo IV contrast; Future      Rekha Brunner, WILMAR-CNP

## 2025-05-09 NOTE — ASSESSMENT & PLAN NOTE
Chronic condition  She has not made any real dietary changes since her last visit.  Did not complete predrawn labs.  We discussed dietary and exercise interventions for tighter control of her cholesterol levels.  We discussed adding fiber to her diet with a goal of 25 to 30 g/day. she is agreeable to beginning these interventions.  We will plan to recheck her cholesterol level with her annual physical exam which is due in September.  We also discussed obtaining CT calcium scoring for further surveillance.  Will follow-up with results once completed.  She would like to remain off of lipid-lowering medications if at all possible, and is motivated to changing her diet and exercise routines.  Orders:    CT cardiac scoring wo IV contrast; Future

## 2025-05-09 NOTE — ASSESSMENT & PLAN NOTE
Anxiety well controlled. Refills sent to pharmacy.   Discussed medication desired effects, potential side effects, and how to administer the medication.   Discussed non-pharmacological interventions such seeing a therapist, stress reduction, diet, exercise, and sleep.   Follow up in 6 months or sooner if needed.   Patient verbalizes understanding regarding plan of care and all questions answered.    Orders:    escitalopram (Lexapro) 20 mg tablet; Take 1 tablet (20 mg) by mouth once daily.

## 2025-05-16 ENCOUNTER — APPOINTMENT (OUTPATIENT)
Dept: AUDIOLOGY | Facility: CLINIC | Age: 60
End: 2025-05-16
Payer: COMMERCIAL

## 2025-05-19 ENCOUNTER — HOSPITAL ENCOUNTER (OUTPATIENT)
Dept: RADIOLOGY | Facility: HOSPITAL | Age: 60
Discharge: HOME | End: 2025-05-19
Payer: COMMERCIAL

## 2025-05-19 VITALS — WEIGHT: 228 LBS | BODY MASS INDEX: 37.99 KG/M2 | HEIGHT: 65 IN

## 2025-05-19 DIAGNOSIS — Z12.31 ENCOUNTER FOR SCREENING MAMMOGRAM FOR BREAST CANCER: ICD-10-CM

## 2025-05-19 PROCEDURE — 77063 BREAST TOMOSYNTHESIS BI: CPT | Performed by: RADIOLOGY

## 2025-05-19 PROCEDURE — 77067 SCR MAMMO BI INCL CAD: CPT

## 2025-05-19 PROCEDURE — 77067 SCR MAMMO BI INCL CAD: CPT | Performed by: RADIOLOGY

## 2025-05-30 ENCOUNTER — APPOINTMENT (OUTPATIENT)
Dept: OTOLARYNGOLOGY | Facility: CLINIC | Age: 60
End: 2025-05-30
Payer: COMMERCIAL

## 2025-05-30 VITALS — HEIGHT: 65 IN | BODY MASS INDEX: 38.32 KG/M2 | WEIGHT: 230 LBS

## 2025-05-30 DIAGNOSIS — J33.8 MAXILLARY SINUS POLYP: ICD-10-CM

## 2025-05-30 DIAGNOSIS — H92.10 OTORRHEA, UNSPECIFIED LATERALITY: Primary | ICD-10-CM

## 2025-05-30 DIAGNOSIS — H90.6 MIXED CONDUCTIVE AND SENSORINEURAL HEARING LOSS OF BOTH EARS: ICD-10-CM

## 2025-05-30 NOTE — PROGRESS NOTES
"HPI  Lexy Cummings is a 60 y.o. female history of bilateral mastoidectomy.  She presents for routine mastoid management.  Wears hearing aids bilaterally.  Last audiogram in August 2024 with mixed hearing loss, thresholds sloping from about 60 dB to 100 dB bilaterally.  She does not have any otalgia on the left.  She is having her molds adjusted and doing much better.  No otorrhea on either side presently.    She has an additional issue today of right sided maxillary sinus finding on recent dental x-ray.  She is not having any specific nasal symptoms and specifically denies midface pain, pressure, nasal drainage and postnasal drip.         Past Medical History:   Diagnosis Date    Allergic 2/2/2020    Fatigue     HL (hearing loss) Following several sets of tubes, two mastoidectomies, and multiple other surgeries.    Sleep apnea     Tinnitus             Medications:     Current Outpatient Medications:     escitalopram (Lexapro) 20 mg tablet, Take 1 tablet (20 mg) by mouth once daily., Disp: 90 tablet, Rfl: 1    famotidine (Pepcid) 20 mg tablet, , Disp: , Rfl:      Allergies:  Allergies   Allergen Reactions    Ibuprofen Anaphylaxis    Naproxen Anaphylaxis, Hives and Shortness of breath    Nsaids (Non-Steroidal Anti-Inflammatory Drug) Hives        Physical Exam:  Last Recorded Vitals  Height 1.651 m (5' 5\"), weight 104 kg (230 lb).  General:     General appearance: Well-developed, well-nourished in no acute distress.       Voice:  normal       Head/face: Normal appearance; nontender to palpation     Facial nerve function: Normal and symmetric bilaterally.    Oral/oropharynx:     Oral vestibule: Normal labial and gingival mucosa     Tongue/floor of mouth: Normal without lesion     Oropharynx: Clear.  No lesions present of the hard/soft palate, posterior pharynx    Neck:     Neck: Normal appearance, trachea midline     Salivary glands: Normal to palpation bilaterally     Lymph nodes: No cervical lymphadenopathy to " palpation     Thyroid: No thyromegaly.  No palpable nodules     Range of motion: Normal    Neurological:     Cortical functions: Alert and oriented x3, appropriate affect       Larynx/hypopharynx:     Laryngeal findings: Mirror exam inadequate or limited secondary to enlarged base of tongue and/or excessive gagging    Ear:     Ear canal: Mastoidectomy bilaterally.  Right clean, dry, skin lined.  Left side with medial inflammation and otorrhea, suctioned.     Tympanic membrane: Intact and mobile bilaterally     Pinna: Normal bilaterally     Hearing:  Gross hearing assessment normal by voice    Nose:     Visualized using: Flexible nasal endoscopy utilized secondary to inadequate anterior rhinoscopy  Nasopharynx: Inadequate mirror examination as above, endoscopy demonstrates normal nasopharynx without obstruction or mass       Nasal dorsum: Nontraumatic midline appearance     Septum: Midline     Inferior turbinates: Normally sized     Mucosa: Bilateral, pink, normal appearing.  No pus or polyps      Assessment/Plan   Regarding her sinus finding at the dentist, she has no evidence of nasal or sinus disease on nasal endoscopy today.  This probably represents a maxillary sinus polyp or mucous retention cyst and is clinically insignificant.  We talked about symptoms to be mindful of including facial pressure, nasal drainage, epistaxis and she will contact us if she is experiencing any of the symptoms.  I have not recommended further workup at present    She will continue her hearing aids.  Recommend Ciprodex again to the left ear for a few days and as needed.  She will leave her left hearing aid out for a few days.  Recheck 6 months, sooner as needed         Monico Mcgill MD

## 2025-06-06 ENCOUNTER — APPOINTMENT (OUTPATIENT)
Dept: OTOLARYNGOLOGY | Facility: CLINIC | Age: 60
End: 2025-06-06
Payer: COMMERCIAL

## 2025-06-10 ENCOUNTER — NURSE TRIAGE (OUTPATIENT)
Dept: AUDIOLOGY | Facility: CLINIC | Age: 60
End: 2025-06-10
Payer: COMMERCIAL

## 2025-06-10 NOTE — TELEPHONE ENCOUNTER
[facility] Call notes copied by Annette Granados on Tuesday Savana 10, 2025  6:06 PM  ------  Documentation by Annette Granados. [[id number]] 6/10/2025  2:15 PM  Hearing aid is not working; going on vacation next week. Sunday right hearing aid stopped producing sound. Patient was scheduled for an audiology assistant repair at [facility] for tubing replacement.

## 2025-06-11 ENCOUNTER — CLINICAL SUPPORT (OUTPATIENT)
Dept: AUDIOLOGY | Facility: CLINIC | Age: 60
End: 2025-06-11

## 2025-06-11 PROCEDURE — V5014 HEARING AID REPAIR/MODIFYING: HCPCS | Performed by: AUDIOLOGIST

## 2025-06-11 NOTE — PROGRESS NOTES
AUDIOLOGY ADULT HEARING AID REPAIR    Name:  Lexy Cummings  :  1965  Age:  60 y.o.  Date of Service:  2025    Reason for visit: Ms. Cummings is seen in the clinic today by audiology assistant for a hearing aid repair appointment. Patient complains that her right hearing aid stopped producing sound.    Hearing aid history and present condition: Ms. Cummings wears 2 Phonak CSL DualCom B90-SP BTE hearing aids with a soft earmold, dampening earhook and tubelock tubing on the right hearing aid and a hard earmold, a dampening earhook and standard tubing on left side. Presently right microphones are blocked and tubing needs replacement; sound is poor.     Hearing aid repair:  Hearing aid right tubing was replaced. Microphones on repaired hearing aid were brushed and vacuumed; earmold was sanitized. Post-repair listening check was good; patient was satisfied with sound.     Charge: $15.00

## 2025-08-25 ENCOUNTER — OFFICE VISIT (OUTPATIENT)
Dept: PRIMARY CARE | Facility: CLINIC | Age: 60
End: 2025-08-25
Payer: COMMERCIAL

## 2025-08-25 VITALS
DIASTOLIC BLOOD PRESSURE: 65 MMHG | HEART RATE: 82 BPM | TEMPERATURE: 98.5 F | BODY MASS INDEX: 39.37 KG/M2 | OXYGEN SATURATION: 96 % | WEIGHT: 236.6 LBS | SYSTOLIC BLOOD PRESSURE: 104 MMHG

## 2025-08-25 DIAGNOSIS — H66.002 NON-RECURRENT ACUTE SUPPURATIVE OTITIS MEDIA OF LEFT EAR WITHOUT SPONTANEOUS RUPTURE OF TYMPANIC MEMBRANE: ICD-10-CM

## 2025-08-25 DIAGNOSIS — J01.40 ACUTE NON-RECURRENT PANSINUSITIS: Primary | ICD-10-CM

## 2025-08-25 PROCEDURE — 99213 OFFICE O/P EST LOW 20 MIN: CPT

## 2025-08-25 RX ORDER — AMOXICILLIN AND CLAVULANATE POTASSIUM 875; 125 MG/1; MG/1
875 TABLET, FILM COATED ORAL 2 TIMES DAILY
Qty: 20 TABLET | Refills: 0 | Status: SHIPPED | OUTPATIENT
Start: 2025-08-25 | End: 2025-09-04

## 2025-08-25 ASSESSMENT — ENCOUNTER SYMPTOMS
FATIGUE: 1
CHEST TIGHTNESS: 0
TROUBLE SWALLOWING: 0
APPETITE CHANGE: 0
VOMITING: 0
DIARRHEA: 0
ABDOMINAL PAIN: 0
DIZZINESS: 0
COUGH: 1
HEADACHES: 1
CHILLS: 1
SHORTNESS OF BREATH: 0
FEVER: 0
LIGHT-HEADEDNESS: 0
SORE THROAT: 1
RHINORRHEA: 1
ACTIVITY CHANGE: 1
SINUS PRESSURE: 1
PALPITATIONS: 0
MYALGIAS: 0
ARTHRALGIAS: 0
WHEEZING: 0
NAUSEA: 0

## 2025-08-25 ASSESSMENT — PATIENT HEALTH QUESTIONNAIRE - PHQ9
SUM OF ALL RESPONSES TO PHQ9 QUESTIONS 1 AND 2: 0
2. FEELING DOWN, DEPRESSED OR HOPELESS: NOT AT ALL
1. LITTLE INTEREST OR PLEASURE IN DOING THINGS: NOT AT ALL

## 2025-08-25 ASSESSMENT — PAIN SCALES - GENERAL: PAINLEVEL_OUTOF10: 8

## 2025-09-04 ENCOUNTER — OFFICE VISIT (OUTPATIENT)
Dept: PRIMARY CARE | Facility: CLINIC | Age: 60
End: 2025-09-04
Payer: COMMERCIAL

## 2025-09-04 VITALS
HEART RATE: 84 BPM | TEMPERATURE: 97.2 F | DIASTOLIC BLOOD PRESSURE: 76 MMHG | SYSTOLIC BLOOD PRESSURE: 125 MMHG | WEIGHT: 236 LBS | OXYGEN SATURATION: 96 % | BODY MASS INDEX: 39.27 KG/M2

## 2025-09-04 DIAGNOSIS — H92.12 OTORRHEA OF LEFT EAR: ICD-10-CM

## 2025-09-04 DIAGNOSIS — J01.40 ACUTE PANSINUSITIS, RECURRENCE NOT SPECIFIED: Primary | ICD-10-CM

## 2025-09-04 PROCEDURE — 1036F TOBACCO NON-USER: CPT

## 2025-09-04 PROCEDURE — 99213 OFFICE O/P EST LOW 20 MIN: CPT

## 2025-09-04 RX ORDER — DOXYCYCLINE 100 MG/1
100 CAPSULE ORAL 2 TIMES DAILY
Qty: 14 CAPSULE | Refills: 0 | Status: SHIPPED | OUTPATIENT
Start: 2025-09-04 | End: 2025-09-11

## 2025-09-04 RX ORDER — FLUTICASONE PROPIONATE 50 MCG
1 SPRAY, SUSPENSION (ML) NASAL DAILY
Qty: 16 G | Refills: 0 | Status: SHIPPED | OUTPATIENT
Start: 2025-09-04

## 2025-09-04 ASSESSMENT — PATIENT HEALTH QUESTIONNAIRE - PHQ9
2. FEELING DOWN, DEPRESSED OR HOPELESS: NOT AT ALL
1. LITTLE INTEREST OR PLEASURE IN DOING THINGS: NOT AT ALL
SUM OF ALL RESPONSES TO PHQ9 QUESTIONS 1 AND 2: 0

## 2025-09-04 ASSESSMENT — ENCOUNTER SYMPTOMS
NAUSEA: 0
HEADACHES: 1
SHORTNESS OF BREATH: 0
SINUS PAIN: 1
COUGH: 1
SINUS PRESSURE: 1
CHILLS: 1
RHINORRHEA: 1
VOMITING: 0
SINUS COMPLAINT: 1
SORE THROAT: 0
FEVER: 0

## 2025-09-04 ASSESSMENT — PAIN SCALES - GENERAL: PAINLEVEL_OUTOF10: 6

## 2025-09-19 ENCOUNTER — APPOINTMENT (OUTPATIENT)
Dept: PRIMARY CARE | Facility: CLINIC | Age: 60
End: 2025-09-19
Payer: COMMERCIAL

## 2025-10-13 ENCOUNTER — APPOINTMENT (OUTPATIENT)
Dept: PRIMARY CARE | Facility: CLINIC | Age: 60
End: 2025-10-13
Payer: COMMERCIAL

## 2025-12-05 ENCOUNTER — APPOINTMENT (OUTPATIENT)
Dept: OTOLARYNGOLOGY | Facility: CLINIC | Age: 60
End: 2025-12-05
Payer: COMMERCIAL